# Patient Record
(demographics unavailable — no encounter records)

---

## 2025-01-21 NOTE — REASON FOR VISIT
[TextEntry] : 76-year-old female who presents for urge incontinence  Patient reports that she has urinary frequency with urgency and urge incontinence.  She is unable to get to the bathroom and so she wears a Pampers.  She feels like she needs go the bathroom every 30 minutes at night.  She also feels that she does not empty well.  She denies any gross hematuria.  She is a G5, P4.  She was told previously that she had pelvic organ prolapse.  However she moved before she could get treatment for that.  She has constipation that she feels is improved.  Uses a cane to ambulate due to lower leg swelling bilaterally.    She has diabetes and back issues   ID # [id number]  PVR minimal Genitourinary:. +atrophy, neg CST, nontender levators, Aa Ba -2 TVL 8 C -7 Ap Bp +1.5.   Patient started on mirabegron 50 mg  Today on 6/12 patient reports that she only got the medicine last week.  She thinks that this helped somewhat but she has only been using it for couple of days.  She denies any side effects.   [id number] RTC 1 month to reasses symptoms.   today on 7/19 pt reports no change in urinary symptoms. she has dysuria with vaginal irritation and thinks she has a UTI. She also wants pampers to be sent to the pharmacy.   used but ID not recorded Started on TVE, aquaphor for external. Start trospium 60mg.  Ucx E.coli- sent cefpodoxime